# Patient Record
Sex: MALE | Race: WHITE | NOT HISPANIC OR LATINO | URBAN - METROPOLITAN AREA
[De-identification: names, ages, dates, MRNs, and addresses within clinical notes are randomized per-mention and may not be internally consistent; named-entity substitution may affect disease eponyms.]

---

## 2022-01-07 ENCOUNTER — OUTPATIENT (OUTPATIENT)
Dept: OUTPATIENT SERVICES | Facility: HOSPITAL | Age: 22
LOS: 1 days | Discharge: HOME | End: 2022-01-07

## 2022-01-07 DIAGNOSIS — Z20.828 CONTACT WITH AND (SUSPECTED) EXPOSURE TO OTHER VIRAL COMMUNICABLE DISEASES: ICD-10-CM

## 2022-01-07 LAB — SARS-COV-2 RNA SPEC QL NAA+PROBE: SIGNIFICANT CHANGE UP

## 2022-04-16 ENCOUNTER — EMERGENCY (EMERGENCY)
Facility: HOSPITAL | Age: 22
LOS: 0 days | Discharge: HOME | End: 2022-04-16
Attending: EMERGENCY MEDICINE | Admitting: STUDENT IN AN ORGANIZED HEALTH CARE EDUCATION/TRAINING PROGRAM
Payer: COMMERCIAL

## 2022-04-16 VITALS
RESPIRATION RATE: 18 BRPM | HEIGHT: 66 IN | DIASTOLIC BLOOD PRESSURE: 69 MMHG | HEART RATE: 88 BPM | SYSTOLIC BLOOD PRESSURE: 144 MMHG | TEMPERATURE: 98 F | WEIGHT: 162.04 LBS | OXYGEN SATURATION: 97 %

## 2022-04-16 DIAGNOSIS — M25.512 PAIN IN LEFT SHOULDER: ICD-10-CM
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
## 2022-04-16 DIAGNOSIS — S46.912A STRAIN OF UNSPECIFIED MUSCLE, FASCIA AND TENDON AT SHOULDER AND UPPER ARM LEVEL, LEFT ARM, INITIAL ENCOUNTER: ICD-10-CM

## 2022-04-16 DIAGNOSIS — S39.012A STRAIN OF MUSCLE, FASCIA AND TENDON OF LOWER BACK, INITIAL ENCOUNTER: ICD-10-CM

## 2022-04-16 DIAGNOSIS — S63.92XA SPRAIN OF UNSPECIFIED PART OF LEFT WRIST AND HAND, INITIAL ENCOUNTER: ICD-10-CM

## 2022-04-16 DIAGNOSIS — S66.912A STRAIN OF UNSPECIFIED MUSCLE, FASCIA AND TENDON AT WRIST AND HAND LEVEL, LEFT HAND, INITIAL ENCOUNTER: ICD-10-CM

## 2022-04-16 DIAGNOSIS — M54.50 LOW BACK PAIN, UNSPECIFIED: ICD-10-CM
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
## 2022-04-16 DIAGNOSIS — M79.89 OTHER SPECIFIED SOFT TISSUE DISORDERS: ICD-10-CM

## 2022-04-16 PROCEDURE — 73030 X-RAY EXAM OF SHOULDER: CPT | Mod: 26,LT

## 2022-04-16 PROCEDURE — 73130 X-RAY EXAM OF HAND: CPT | Mod: 26,LT

## 2022-04-16 PROCEDURE — 99284 EMERGENCY DEPT VISIT MOD MDM: CPT

## 2022-04-16 PROCEDURE — 73110 X-RAY EXAM OF WRIST: CPT | Mod: 26,LT

## 2022-04-16 NOTE — ED PROVIDER NOTE - MUSCULOSKELETAL, MLM
mild swelling to left hand , with tenderness over wrist and left ant shoulder, FROM, NV intact, no spinal tenderness, mild right lower back soreness, FROM
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
## 2022-04-16 NOTE — ED PROVIDER NOTE - NS ED ATTENDING STATEMENT MOD
This was a shared visit with the YAMEL. I reviewed and verified the documentation and independently performed the documented:

## 2022-04-16 NOTE — ED PROVIDER NOTE - PROGRESS NOTE DETAILS
ED Attending AURORA Frankel  pt aware of xray, placed in ace bandage, neurovascular intact, aware of proper use of ace bandage, aware of signs and symptoms to return for, will follow up with ortho as discussed.

## 2022-04-16 NOTE — ED ADULT TRIAGE NOTE - STATUS:
Applied
Implemented All Universal Safety Interventions:  [place] to call system. Call bell, personal items and telephone within reach. Instruct patient to call for assistance. Room bathroom lighting operational. Non-slip footwear when patient is off stretcher. Physically safe environment: no spills, clutter or unnecessary equipment. Stretcher in lowest position, wheels locked, appropriate side rails in place.

## 2022-04-16 NOTE — ED PROVIDER NOTE - CARE PROVIDER_API CALL
Santos Carreon (MD)  Orthopaedic Surgery  [address] [place] [address]  Phone: [phone]  Fax: [phone]  Follow Up Time:

## 2022-04-16 NOTE — ED PROVIDER NOTE - NSFOLLOWUPINSTRUCTIONS_ED_ALL_ED_FT
Encounter Date:05/22/2018    Patient ID: Eric Myrick is a 80 y.o. male who is  and resides in [place], [place]    CC/Reason for visit:  Coronary Artery Disease            Eric Myrick returns today for follow-up of his coronary artery disease, hyperlipidemia, paroxysmal atrial fibrillation and for loop recorder interrogation.  Since his last visit he has had no hospitalizations or new medical diagnoses.  Device interrogation today shows 2 tachycardic episodes that no atrial fibrillation.  Longest episode lasted on 0.5 minutes and heart rates increased to 155 bpm.  He was asymptomatic when this occurred.  No atrial fibrillation was detected however in 2015 2 episodes were noted.  He is chronically anticoagulated with Eliquis twice daily.  He is tolerating without any reports of active or overt bleeding.  The patient is tolerating statin therapy without any reports of myalgias.  He has a history of coronary artery disease and underwent angioplasty back in 1992.  He denies chest pain, dyspnea, orthopnea, palpitations or syncope.  He denies any signs or symptoms to suggest a TIA or stroke.  His only complaint today is of arthritis.  He is wondering if he can take Celebrex.    Review of Systems   Constitution: Negative for weakness and malaise/fatigue.   Eyes: Negative for vision loss in left eye and vision loss in right eye.   Cardiovascular: Negative for chest pain, dyspnea on exertion, near-syncope, orthopnea, palpitations, paroxysmal nocturnal dyspnea and syncope.   Musculoskeletal: Positive for arthritis. Negative for myalgias.   Neurological: Negative for brief paralysis, excessive daytime sleepiness, focal weakness, numbness and paresthesias.   All other systems reviewed and are negative.      The patient's past medical, social, family history and ROS reviewed in the patient's electronic medical record.    Allergies  Patient has no known allergies.    Outpatient Prescriptions Marked as Taking for  "the 5/22/18 encounter (Office Visit) with JENSEN Edward   Medication Sig Dispense Refill   • apixaban (ELIQUIS) 5 MG tablet tablet Take 1 tablet by mouth Every 12 (Twelve) Hours for 360 days. 180 tablet 3   • atorvastatin (LIPITOR) 40 MG tablet Take 1 tablet by mouth Every Night for 360 days. 90 tablet 3   • omeprazole (PriLOSEC) 40 MG capsule Every Night.           Blood pressure 142/60, pulse 79, height 167.6 cm (66\"), weight 73.5 kg (162 lb).  Body mass index is 26.15 kg/m².    Physical Exam   Constitutional: He is oriented to person, place, and time. He appears well-developed and well-nourished.   HENT:   Head: Normocephalic and atraumatic.   Eyes: Pupils are equal, round, and reactive to light. No scleral icterus.   Neck: No JVD present. Carotid bruit is not present. No thyromegaly present.   Cardiovascular: Normal rate, regular rhythm, S1 normal and S2 normal.  Exam reveals no gallop.    No murmur heard.  Pulmonary/Chest: Effort normal and breath sounds normal.   Abdominal: Soft. There is no hepatosplenomegaly. There is no tenderness.   Neurological: He is alert and oriented to person, place, and time.   Skin: Skin is warm and dry. No cyanosis. Nails show no clubbing.   Psychiatric: He has a normal mood and affect. His behavior is normal.       Data Review:   Procedures       Problem List Items Addressed This Visit        Cardiovascular and Mediastinum    Coronary artery disease involving native coronary artery of native heart without angina pectoris    Overview     · Cardiac catheterization for acute MI, [place], 1992: Angioplasty only.          Current Assessment & Plan     · Recommend starting aspirin 81 mg but will defer as patient intolerant due to nosebleed         Hyperlipidemia LDL goal <100    Overview     · Recommend moderate intensity statin therapy for stroke prevention         Current Assessment & Plan     · Continue Lipitor 40 mg daily  · Follow-up PCP for routine lipid monitoring   "       Paroxysmal atrial fibrillation    Overview     · Confirmed on loop recorder, 2015  · Chads VASC = 5 (age > 75, CVA, CAD)         Current Assessment & Plan     · No signs or symptoms of TIA or stroke  · Continue Elliquis 5 mg twice a day  · No atrial fibrillation detected on loop recorder interrogation            Other    History of loop recorder - Primary    Overview     · 11/30/2014: 2 episodes of brief atrial fibrillation         Current Assessment & Plan     · No atrial fibrillation detected on Loop recorder interrogation today.  · Device interrogation in 6 months               Patient is doing well from a cardiovascular standpoint.  He has no signs or symptoms to suggest angina, heart failure.  His loop recorder showed no atrial fibrillation.  We will continue his current medications.  Instructed him to follow-up with his primary care provider concerning his arthritis.  I informed him of the cardiovascular risk of taking Celebrex but the benefits could outweigh the risk as he is in a lot of pain and unable to be active because of his arthritis.     · Continue current medications  · Follow-up PCP for arthritis management.  May require a medicine such as Celebrex  · Follow-up 6 months for loop recorder interrogation or sooner if needed.    Jacqueline Nava, APRN  5/22/2018   rest. Motrin for pain, Ice to injuries today and tomorrow, See orthopedic MD for follow up this week rest. Motrin for pain, Ice to injuries today and tomorrow, See orthopedic MD for follow up this week        Hand Sprain    WHAT YOU NEED TO KNOW:    A hand sprain is when a ligament in your hand is stretched or torn. Ligaments are the strong tissues that connect bones. You may have bruising, pain, and swelling of your injured hand.    DISCHARGE INSTRUCTIONS:    Call your doctor if:   •The skin of your injured hand looks bluish or pale (less color than normal).      •You have increased swelling and pain in your hand.      •You have new or increased numbness in your injured hand.      •You have new or increased stiffness or trouble moving your injured hand.      •You have questions or concerns about your injury or treatment.      Medicines:   •NSAIDs help decrease swelling and pain or fever. This medicine is available with or without a doctor's order. NSAIDs can cause stomach bleeding or kidney problems in certain people. If you take blood thinner medicine, always ask your healthcare provider if NSAIDs are safe for you. Always read the medicine label and follow directions.      •Take your medicine as directed. Contact your healthcare provider if you think your medicine is not helping or if you have side effects. Tell him or her if you are allergic to any medicine. Keep a list of the medicines, vitamins, and herbs you take. Include the amounts, and when and why you take them. Bring the list or the pill bottles to follow-up visits. Carry your medicine list with you in case of an emergency.      Rest your hand: You will need to rest your hand for 1 to 2 days after your injury. This will help decrease the risk of more damage to your hand. Do not lift anything with your injured hand. Ask your healthcare provider when you can return to your normal activities.    Ice your hand: Ice your hand to help decrease swelling and pain. Put crushed ice in a plastic bag and cover it with a towel. Put the ice on your hand for 15 to 20 minutes every hour. Use ice as directed.    Use compression: Compression (tight hold) provides support and helps decrease swelling and movement so your hand can heal. You may need to keep your hand wrapped with an elastic bandage.    Elevate your hand: Keep your injured hand raised above the level of your heart as often as you can. This will help decrease or limit swelling. You can elevate your hand by resting your arm up on a pillow.    Use a splint: You may need to use a splint on your hand and wrist. A splint is a special device that keeps your hand and wrist from moving. Use the splint as directed.    Exercise your hand: You may be given exercises to improve your strength once you are able to move your hand without pain. Exercises will also help decrease stiffness. Start your exercises and normal activities slowly. Exercise your hand as directed.    Follow up with your doctor as directed: Write down your questions you so you remember to ask them during your visits.       © Copyright [email or web address] 2020         Shoulder Pain    Many things can cause shoulder pain, including:    An injury to the area.  Overuse of the shoulder.  Arthritis.    The source of the pain can be:    Inflammation.  An injury to the shoulder joint.  An injury to a tendon, ligament, or bone.    HOME CARE INSTRUCTIONS  Take these actions to help with your pain:     Squeeze a soft ball or a foam pad as much as possible. This helps to keep the shoulder from swelling. It also helps to strengthen the arm.  Take over-the-counter and prescription medicines only as told by your health care provider.  If directed, apply ice to the area:  Put ice in a plastic bag.  Place a towel between your skin and the bag.  Leave the ice on for 20 minutes, 2–3 times per day. Stop applying ice if it does not help with the pain.  If you were given a shoulder sling or immobilizer:  Wear it as told.  Remove it to shower or bathe.  Move your arm as little as possible, but keep your hand moving to prevent swelling.    SEEK MEDICAL CARE IF:  Your pain gets worse.  Your pain is not relieved with medicines.  New pain develops in your arm, hand, or fingers.    SEEK IMMEDIATE MEDICAL CARE IF:  Your arm, hand, or fingers:  Tingle.  Become numb.  Become swollen.  Become painful.  Turn white or blue.    ADDITIONAL NOTES AND INSTRUCTIONS    Please follow up with your Primary MD in 24-48 hr.  Seek immediate medical care for any new/worsening signs or symptoms.
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
## 2022-04-16 NOTE — ED PROVIDER NOTE - CLINICAL SUMMARY MEDICAL DECISION MAKING FREE TEXT BOX
Patient is a good candidate to attempt outpatient management. Supportive care and home care discussed in detail. Patient aware they may have to return for re-evaluation if outpatient treatment fails. Strict return precautions discussed.  Will follow up with ortho as discussed.

## 2022-04-16 NOTE — ED PROVIDER NOTE - PATIENT PORTAL LINK FT
You can access the FollowMyHealth Patient Portal offered by [facility] by registering at the following website: [email or web address]. By joining [facility] FollowMyHealth portal, you will also be able to view your health information using other applications (apps) compatible with our system.